# Patient Record
Sex: FEMALE | Race: WHITE | Employment: UNEMPLOYED | URBAN - METROPOLITAN AREA
[De-identification: names, ages, dates, MRNs, and addresses within clinical notes are randomized per-mention and may not be internally consistent; named-entity substitution may affect disease eponyms.]

---

## 2017-01-01 ENCOUNTER — TELEPHONE (OUTPATIENT)
Dept: FAMILY MEDICINE CLINIC | Age: 67
End: 2017-01-01

## 2017-01-03 NOTE — TELEPHONE ENCOUNTER
Pt called and states that she has been sl short of breath that last 2 days. Humidity was 100%. States that she felt better taking prednisone and wants to know if you wanted to increase her prednisone. Pt can be reached [phone]. Routing to [facility], [place].

## 2017-01-04 NOTE — TELEPHONE ENCOUNTER
She said she is still short of breath and has a cough, she said her COPD is acting up due to the increased humidity in the air right now.

## 2017-01-04 NOTE — TELEPHONE ENCOUNTER
She can take two prednisone for the next three days and then decrease back to one prednisone. If she is not improving or if she is worsening I will need to see her.    Thanks

## 2017-01-04 NOTE — TELEPHONE ENCOUNTER
Pt informed of message from [facility] from 1/3/17. Spoke to pt this AM and she states that she was having trouble breathing last night and not breathing any better. Routing to [facility] FNP.

## 2017-01-04 NOTE — TELEPHONE ENCOUNTER
Spoke to pt and gave her note from [facility] F[facility],pt verbalized understanding.

## 2017-01-10 NOTE — TELEPHONE ENCOUNTER
Spoke with daughter Austen Hendrix (on disclosure) and she advised mother was at [facility]. I told her we had spoken with [facility] and were aware of patient's admission. She will contact office prior to discharge to schedule follow up for patient.